# Patient Record
Sex: FEMALE | Race: AMERICAN INDIAN OR ALASKA NATIVE
[De-identification: names, ages, dates, MRNs, and addresses within clinical notes are randomized per-mention and may not be internally consistent; named-entity substitution may affect disease eponyms.]

---

## 2019-08-10 ENCOUNTER — HOSPITAL ENCOUNTER (EMERGENCY)
Dept: HOSPITAL 5 - ED | Age: 44
Discharge: HOME | End: 2019-08-10
Payer: SELF-PAY

## 2019-08-10 VITALS — DIASTOLIC BLOOD PRESSURE: 72 MMHG | SYSTOLIC BLOOD PRESSURE: 132 MMHG

## 2019-08-10 DIAGNOSIS — F12.10: ICD-10-CM

## 2019-08-10 DIAGNOSIS — Z79.899: ICD-10-CM

## 2019-08-10 DIAGNOSIS — K85.90: Primary | ICD-10-CM

## 2019-08-10 LAB
ALBUMIN SERPL-MCNC: 4.5 G/DL (ref 3.9–5)
ALT SERPL-CCNC: 10 UNITS/L (ref 7–56)
BASOPHILS # (AUTO): 0 K/MM3 (ref 0–0.1)
BASOPHILS NFR BLD AUTO: 0.2 % (ref 0–1.8)
BILIRUB UR QL STRIP: (no result)
BLOOD UR QL VISUAL: (no result)
BUN SERPL-MCNC: 21 MG/DL (ref 7–17)
BUN/CREAT SERPL: 23 %
CALCIUM SERPL-MCNC: 9.1 MG/DL (ref 8.4–10.2)
EOSINOPHIL # BLD AUTO: 0 K/MM3 (ref 0–0.4)
EOSINOPHIL NFR BLD AUTO: 0.6 % (ref 0–4.3)
HCT VFR BLD CALC: 35.2 % (ref 30.3–42.9)
HEMOLYSIS INDEX: 3
HGB BLD-MCNC: 11.6 GM/DL (ref 10.1–14.3)
LYMPHOCYTES # BLD AUTO: 1.2 K/MM3 (ref 1.2–5.4)
LYMPHOCYTES NFR BLD AUTO: 34.6 % (ref 13.4–35)
MCHC RBC AUTO-ENTMCNC: 33 % (ref 30–34)
MCV RBC AUTO: 90 FL (ref 79–97)
MONOCYTES # (AUTO): 0.6 K/MM3 (ref 0–0.8)
MONOCYTES % (AUTO): 15.3 % (ref 0–7.3)
MUCOUS THREADS #/AREA URNS HPF: (no result) /HPF
PH UR STRIP: 5 [PH] (ref 5–7)
PLATELET # BLD: 213 K/MM3 (ref 140–440)
PROT UR STRIP-MCNC: (no result) MG/DL
RBC # BLD AUTO: 3.93 M/MM3 (ref 3.65–5.03)
RBC #/AREA URNS HPF: 1 /HPF (ref 0–6)
UROBILINOGEN UR-MCNC: < 2 MG/DL (ref ?–2)
WBC #/AREA URNS HPF: 4 /HPF (ref 0–6)

## 2019-08-10 PROCEDURE — 36415 COLL VENOUS BLD VENIPUNCTURE: CPT

## 2019-08-10 PROCEDURE — 80053 COMPREHEN METABOLIC PANEL: CPT

## 2019-08-10 PROCEDURE — 74177 CT ABD & PELVIS W/CONTRAST: CPT

## 2019-08-10 PROCEDURE — 96374 THER/PROPH/DIAG INJ IV PUSH: CPT

## 2019-08-10 PROCEDURE — 81025 URINE PREGNANCY TEST: CPT

## 2019-08-10 PROCEDURE — 96375 TX/PRO/DX INJ NEW DRUG ADDON: CPT

## 2019-08-10 PROCEDURE — 85025 COMPLETE CBC W/AUTO DIFF WBC: CPT

## 2019-08-10 PROCEDURE — 96376 TX/PRO/DX INJ SAME DRUG ADON: CPT

## 2019-08-10 PROCEDURE — 81001 URINALYSIS AUTO W/SCOPE: CPT

## 2019-08-10 PROCEDURE — 99284 EMERGENCY DEPT VISIT MOD MDM: CPT

## 2019-08-10 PROCEDURE — 83690 ASSAY OF LIPASE: CPT

## 2019-08-10 NOTE — EMERGENCY DEPARTMENT REPORT
ED Abdominal Pain HPI





- General


Chief Complaint: Abdominal Pain


Stated Complaint: RT SIDE PAIN


Time Seen by Provider: 08/10/19 04:00


Source: patient


Mode of arrival: Ambulatory


Limitations: No Limitations





- History of Present Illness


Initial Comments: 





Patient is a 43-year-old American female with no past medical history presents 

to the ED with complaint of acute onset persistent severe right lower quadrant 

abdominal pain with nausea for the last 3 weeks worse in the last 2 days.  

Patient states that she was initially evaluated in this ED 10 days ago and 

diagnosed with right ovarian cease for which she was given pain medications and 

prescription to take her home.  Patient states that she had been taking these 

medications since then but that the pain has worsened in the last 2 days side 

that about 6 hours ago she has not been able to sleep because of severe pain.  

Patient denies fever, chills, dizziness, dysuria, urinary frequency and urgency,

vaginal bleeding, vaginal discharge, diarrhea, vomiting, chest pain, shortness 

of breath, low back pain or hematuria and cough.


MD Complaint: abdominal pain (RLQ ), other (nausea)


-: Gradual, week(s) (3)


Location: RLQ, suprapubic


Radiation: RLQ, suprapubic


Migration to: no migration


Severity: severe


Severity scale (0 -10): 8


Quality: cramping, aching, sharp


Consistency: constant


Improves With: nothing


Worsens With: nothing


Associated Symptoms: denies other symptoms, nausea.  denies: vomiting, diarrhea,

fever, chills, constipation, dysuria, hematemesis, hematochezia, melena, 

hematuria, anorexia, syncope





- Related Data


                                Home Medications











 Medication  Instructions  Recorded  Confirmed  Last Taken


 


oxyCODONE /ACETAMINOPHEN [Percocet 1 tab PO Q4H PRN 19 

06:00





5/325 mg]    








                                  Previous Rx's











 Medication  Instructions  Recorded  Last Taken  Type


 


Ibuprofen [Motrin 800 MG tab] 800 mg PO Q8HR PRN #20 tablet 19 Unknown Rx


 


Ondansetron [Zofran ODT TAB] 8 mg PO Q8HR #20 tab.rapdis 19 Unknown Rx


 


Sulfamethoxazole/Trimethoprim 1 each PO BID #14 tablet 19 Unknown Rx





[Bactrim DS TAB]    











                                    Allergies











Allergy/AdvReac Type Severity Reaction Status Date / Time


 


No Known Allergies Allergy   Verified 08/01/19 09:11














ED Review of Systems


ROS: 


Stated complaint: RT SIDE PAIN


Other details as noted in HPI





Constitutional: denies: chills, fever


Eyes: denies: eye pain, eye discharge, vision change


ENT: denies: ear pain, throat pain


Respiratory: denies: cough, shortness of breath, wheezing


Cardiovascular: denies: chest pain, palpitations


Endocrine: no symptoms reported


Gastrointestinal: abdominal pain (RLQ ), nausea.  denies: diarrhea


Genitourinary: denies: urgency, dysuria, discharge


Musculoskeletal: denies: back pain, joint swelling, arthralgia


Skin: denies: rash, lesions


Neurological: denies: headache, weakness, paresthesias


Psychiatric: denies: anxiety, depression


Hematological/Lymphatic: denies: easy bleeding, easy bruising





ED Past Medical Hx





- Past Medical History


Previous Medical History?: Yes


Additional medical history: Polycystic ovarian syndrome, Right ovarian cyst





- Surgical History


Past Surgical History?: Yes


Additional Surgical History:  2





- Social History


Smoking Status: Never Smoker


Substance Use Type: Alcohol, Marijuana





- Medications


Home Medications: 


                                Home Medications











 Medication  Instructions  Recorded  Confirmed  Last Taken  Type


 


Ibuprofen [Motrin 800 MG tab] 800 mg PO Q8HR PRN #20 tablet 19  Unknown Rx


 


Ondansetron [Zofran ODT TAB] 8 mg PO Q8HR #20 tab.rapdis 19  Unknown Rx


 


Sulfamethoxazole/Trimethoprim 1 each PO BID #14 tablet 19  Unknown Rx





[Bactrim DS TAB]     


 


oxyCODONE /ACETAMINOPHEN [Percocet 1 tab PO Q4H PRN 19 

06:00 History





5/325 mg]     














ED Physical Exam





- General


Limitations: No Limitations


General appearance: alert, in no apparent distress





- Head


Head exam: Present: atraumatic, normocephalic, normal inspection





- Eye


Eye exam: Present: normal appearance, PERRL, EOMI.  Absent: scleral icterus, 

conjunctival injection, nystagmus, periorbital swelling, periorbital tenderness,

other


Pupils: Present: normal accommodation





- ENT


ENT exam: Present: normal exam, normal orophraynx, mucous membranes moist, TM's 

normal bilaterally, normal external ear exam





- Neck


Neck exam: Present: normal inspection, full ROM.  Absent: tenderness, 

lymphadenopathy, thyromegaly





- Respiratory


Respiratory exam: Present: normal lung sounds bilaterally.  Absent: respiratory 

distress, wheezes, rales, rhonchi, chest wall tenderness, accessory muscle use, 

decreased breath sounds, prolonged expiratory





- Cardiovascular


Cardiovascular Exam: Present: regular rate, normal rhythm, normal heart sounds. 

Absent: systolic murmur, diastolic murmur, rubs, gallop





- GI/Abdominal


GI/Abdominal exam: Present: soft, tenderness (RLQ), guarding, normal bowel 

sounds.  Absent: rebound, rigid, hyperactive bowel sounds, hypoactive bowel 

sounds, organomegaly





- Rectal


Rectal exam: Present: deferred





- Extremities Exam


Extremities exam: Present: normal inspection, full ROM, normal capillary refill





- Back Exam


Back exam: Present: normal inspection, full ROM.  Absent: tenderness, CVA 

tenderness (L), muscle spasm, paraspinal tenderness, vertebral tenderness





- Neurological Exam


Neurological exam: Present: alert, oriented X3, CN II-XII intact, normal gait, 

reflexes normal





- Psychiatric


Psychiatric exam: Present: normal affect, normal mood





- Skin


Skin exam: Present: warm, dry, intact, normal color.  Absent: rash





ED Course


                                   Vital Signs











  08/10/19 08/10/19 08/10/19





  03:49 04:34 05:04


 


Temperature 98.4 F  


 


Pulse Rate 76  


 


Respiratory 18 16 16





Rate   


 


Blood Pressure 145/100  


 


O2 Sat by Pulse 99  





Oximetry   














- Reevaluation(s)


Reevaluation #1: 





08/10/19 05:18


This is a 43-year-old female who presented with a ED with acute onset persiste

nce of the right lower quadrant abdominal pain with nausea.  In the ED, patient 

is alert and oriented 3 and is not in distress but appears to be in severe pain

as she doubled down on knees while laying on the bed.  Vital signs are stable.  

Labs were drawn and patient was treated for pain, and abdomen and pelvic CT scan

with contrast was also ordered. Patient care transferred to Mr. Aimee albright PA-C at shift change at 0700 hours


08/10/19 07:09








ED Medical Decision Making





- Lab Data


Result diagrams: 


                                 08/10/19 04:14





                                 08/10/19 04:14





- Radiology Data


Radiology results: report reviewed, image reviewed





- Medical Decision Making





This is a 43-year-old female who presented with a ED with acute onset 

persistence of the right lower quadrant abdominal pain with nausea.  In the ED, 

patient is alert and oriented 3 and is not in distress but appears to be in 

severe pain as she doubled down on knees while laying on the bed.  Vital signs 

are stable.  Labs were drawn and patient was treated for pain, and abdomen and 

pelvic CT scan with contrast was also ordered. Patient care transferred to Johnny 

Aimee Moisés OLIVEROS at shift change at 0700 hours





- Differential Diagnosis


Abdominal pain; Appendicitis; Pancreatitis; Gallstones, UTI; Ovarian cyst


Critical care attestation.: 


If time is entered above; I have spent that time in minutes in the direct care 

of this critically ill patient, excluding procedure time.








ED Disposition


Clinical Impression: 


 Acute abdominal pain in right lower quadrant





Acute pancreatitis


Qualifiers:


 Pancreatitis type: unspecified pancreatitis type Acute pancreatitis 

complication: unspecified Qualified Code(s): K85.90 - Acute pancreatitis without

necrosis or infection, unspecified





Condition: Stable


Instructions:  Abdominal Pain (ED)

## 2019-08-10 NOTE — CAT SCAN REPORT
CT ABDOMEN AND PELVIS WITH CONTRAST



HISTORY: RLQ abdominal pain.



COMPARISON: None.



TECHNIQUE: CT images of the abdomen and pelvis were obtained following administration of intravenous 
contrast.  All CT scans at this location are performed using CT dose reduction for ALARA by means of 
automated exposure control. 



CONTRAST: 100 ml of intravenous contrast administered.



FINDINGS:



Lungs/bones:  Lung bases are clear. No acute osseous abnormality.



Abdomen/pelvis:  The liver, gallbladder, spleen, pancreas, adrenals, kidneys, and proximal GI tract a
ppear normal.



Urinary bladder and uterus appear unremarkable. There is a simple right-sided ovarian cyst measuring 
4.6 cm on image #74 of series 4. No pelvic free fluid. No acute colonic abnormality. The terminal ile
um and appendix appear normal.



IMPRESSION:

1. Simple right-sided ovarian cyst as above. Otherwise nothing acute identified.



Signer Name: Javier Chapman MD 

Signed: 8/10/2019 7:36 AM

 Workstation Name: [id number]

## 2019-08-10 NOTE — EMERGENCY DEPARTMENT REPORT
Addis Doc





- Documentation


Documentation: 





43-year-old  female Miss Scott seen by Thomas Maradiaga for right-sided

pain.  Reports a 3 week history of progressively worsening right lower quadrant 

pain that was originally found to be secondary to an ovarian cyst.  She has been

taken some prescribed medication.  His symptoms do persist.  Negative follow-up 

with OB/GYN.  CT scans were were [facility] were obtained and showed a 4.6 cm cyst

to the right ovary, otherwise simple complex nature no stranding.  Lipase was 

slightly elevated at 210 but there is no pancreatic stranding or alcohol abscess

found on the CT.  Pain was controlled with Toradol IV.  She has not yet follow-

up with OB GYN did discuss in detail the need to follow with GYN for definitive 

management of her ovarian cysts.  Hemodynamically stable, afebrile.  No vaginal 

bleeding or vaginal vaginal vaginal discharge.  She does tolerate oral with no 

complication.





Plan is to is to treat the ED pain with Toradol tablets in conjunction with 

having her to follow with OB/GYN for definitive management of this simple cyst. 

In regards to the elevated lipase.  We'll place her on a liquid diet and with 

the exception of her medications for the next 48 hours and then reevaluate her 

lipase that status.  She should develop any fever,, vomiting, worsening 

epigastric pain, then we will repeat DURAN evaluate her pancreas at that time.  

Her blood sugar sugars are normal.